# Patient Record
Sex: FEMALE | Race: BLACK OR AFRICAN AMERICAN | Employment: PART TIME | URBAN - METROPOLITAN AREA
[De-identification: names, ages, dates, MRNs, and addresses within clinical notes are randomized per-mention and may not be internally consistent; named-entity substitution may affect disease eponyms.]

---

## 2019-09-11 ENCOUNTER — APPOINTMENT (OUTPATIENT)
Dept: GENERAL RADIOLOGY | Age: 29
End: 2019-09-11
Payer: COMMERCIAL

## 2019-09-11 ENCOUNTER — HOSPITAL ENCOUNTER (EMERGENCY)
Age: 29
Discharge: HOME | End: 2019-09-11
Payer: COMMERCIAL

## 2019-09-11 VITALS
HEIGHT: 67 IN | OXYGEN SATURATION: 99 % | DIASTOLIC BLOOD PRESSURE: 90 MMHG | SYSTOLIC BLOOD PRESSURE: 124 MMHG | HEART RATE: 80 BPM | TEMPERATURE: 98 F | BODY MASS INDEX: 19.34 KG/M2 | RESPIRATION RATE: 16 BRPM | WEIGHT: 123.24 LBS

## 2019-09-11 DIAGNOSIS — S80.01XA CONTUSION OF RIGHT KNEE, INITIAL ENCOUNTER: ICD-10-CM

## 2019-09-11 DIAGNOSIS — S09.90XA CLOSED HEAD INJURY, INITIAL ENCOUNTER: ICD-10-CM
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
## 2019-09-11 DIAGNOSIS — S80.02XA CONTUSION OF LEFT KNEE, INITIAL ENCOUNTER: Primary | ICD-10-CM

## 2019-09-11 PROCEDURE — 73560 X-RAY EXAM OF KNEE 1 OR 2: CPT

## 2019-09-11 PROCEDURE — 6370000000 HC RX 637 (ALT 250 FOR IP): Performed by: PHYSICIAN ASSISTANT

## 2019-09-11 PROCEDURE — 99284 EMERGENCY DEPT VISIT MOD MDM: CPT

## 2019-09-11 RX ORDER — NAPROXEN 500 MG/1
500 TABLET ORAL 2 TIMES DAILY PRN
Qty: 20 TABLET | Refills: 0 | Status: SHIPPED | OUTPATIENT
Start: 2019-09-11 | End: 2022-08-31

## 2019-09-11 RX ORDER — NAPROXEN 250 MG/1
500 TABLET ORAL ONCE
Status: COMPLETED | OUTPATIENT
Start: 2019-09-11 | End: 2019-09-11

## 2019-09-11 RX ORDER — ACETAMINOPHEN 500 MG
1000 TABLET ORAL ONCE
Status: COMPLETED | OUTPATIENT
Start: 2019-09-11 | End: 2019-09-11

## 2019-09-11 RX ADMIN — NAPROXEN 500 MG: 250 TABLET ORAL at 09:31

## 2019-09-11 RX ADMIN — ACETAMINOPHEN 1000 MG: 500 TABLET ORAL at 09:31

## 2019-09-11 ASSESSMENT — PAIN - FUNCTIONAL ASSESSMENT
PAIN_FUNCTIONAL_ASSESSMENT: ACTIVITIES ARE NOT PREVENTED
PAIN_FUNCTIONAL_ASSESSMENT: ACTIVITIES ARE NOT PREVENTED
PAIN_FUNCTIONAL_ASSESSMENT: 0-10
PAIN_FUNCTIONAL_ASSESSMENT: ACTIVITIES ARE NOT PREVENTED

## 2019-09-11 ASSESSMENT — PAIN SCALES - GENERAL
PAINLEVEL_OUTOF10: 8
PAINLEVEL_OUTOF10: 8
PAINLEVEL_OUTOF10: 6

## 2019-09-11 ASSESSMENT — PAIN DESCRIPTION - PROGRESSION
CLINICAL_PROGRESSION: GRADUALLY IMPROVING
CLINICAL_PROGRESSION: GRADUALLY IMPROVING
CLINICAL_PROGRESSION: NOT CHANGED

## 2019-09-11 ASSESSMENT — PAIN DESCRIPTION - PAIN TYPE
TYPE: ACUTE PAIN

## 2019-09-11 ASSESSMENT — PAIN DESCRIPTION - DESCRIPTORS
DESCRIPTORS: ACHING;THROBBING;BURNING
DESCRIPTORS: ACHING;THROBBING;BURNING

## 2019-09-11 ASSESSMENT — PAIN DESCRIPTION - ORIENTATION
ORIENTATION: RIGHT;LEFT

## 2019-09-11 ASSESSMENT — PAIN DESCRIPTION - ONSET
ONSET: ON-GOING

## 2019-09-11 ASSESSMENT — ENCOUNTER SYMPTOMS
VOMITING: 0
ABDOMINAL PAIN: 0
SHORTNESS OF BREATH: 0
NAUSEA: 0
BACK PAIN: 0

## 2019-09-11 ASSESSMENT — PAIN DESCRIPTION - FREQUENCY
FREQUENCY: CONTINUOUS

## 2019-09-11 ASSESSMENT — PAIN DESCRIPTION - LOCATION
LOCATION: KNEE

## 2019-09-11 NOTE — LETTER
[facility] Emergency Department  [address] [facility] [address]  Phone: [phone]               September 11, 2019    Patient: Sherine Castañeda   YOB: 1990   Date of Visit: 9/11/2019       To Whom It May Concern:    Sherine Castañeda was seen and treated in our emergency department on 9/11/2019. She may return to work on 9/12/19.       Sincerely,       Leticia Storey RN         Signature:__________________________________

## 2019-09-11 NOTE — ED PROVIDER NOTES
reviewed. No pertinent surgical history. CURRENT MEDICATIONS       Discharge Medication List as of 9/11/2019 10:27 AM          ALLERGIES     Patient has no known allergies. FAMILY HISTORY     History reviewed. No pertinent family history. No family status information on file. SOCIAL HISTORY    reports that she has quit smoking. Her smoking use included cigars. She has never used smokeless tobacco. She reports that she has current or past drug history. Drug: Marijuana. She reports that she does not drink alcohol. PHYSICAL EXAM    (up to 7 for level 4, 8 or more for level 5)     ED Triage Vitals   BP Temp Temp Source Pulse Resp SpO2 Height Weight   09/11/19 0918 09/11/19 1015 09/11/19 1015 09/11/19 0918 09/11/19 0918 09/11/19 0918 09/11/19 0918 09/11/19 0918   (!) 126/98 98 °F (36.7 °C) Oral 89 17 99 % 5' 7\" (1.702 m) 123 lb 3.8 oz (55.9 kg)       Physical Exam   Constitutional: She is oriented to person, place, and time. She appears well-developed and well-nourished. No distress. HENT:   Head: Normocephalic and atraumatic. No hematoma or ecchymosis noted   Eyes: Pupils are equal, round, and reactive to light. EOM are normal.   Neck: Neck supple. No tenderness   Pulmonary/Chest: Effort normal. No respiratory distress. Musculoskeletal: Normal range of motion. 2 small ecchymotic areas to the medial aspect of the calves bilaterally. There is mild diffuse tenderness to the knees bilaterally without edema or effusion, full ROM bilateral knees. Neurological: She is alert and oriented to person, place, and time. Skin: Skin is warm and dry. Psychiatric: She has a normal mood and affect. Her behavior is normal.   Nursing note and vitals reviewed.          DIAGNOSTIC RESULTS     RADIOLOGY:   Non-plain film images such as CT, Ultrasound and MRI are read by the radiologist.Plain radiographic images are visualized and preliminarily interpreted by Junito Phoenix PA-C with the below

## 2022-08-31 ENCOUNTER — HOSPITAL ENCOUNTER (EMERGENCY)
Age: 32
Discharge: HOME | End: 2022-08-31
Attending: EMERGENCY MEDICINE
Payer: COMMERCIAL

## 2022-08-31 ENCOUNTER — APPOINTMENT (OUTPATIENT)
Dept: GENERAL RADIOLOGY | Age: 32
End: 2022-08-31
Payer: COMMERCIAL

## 2022-08-31 VITALS
TEMPERATURE: 97.6 F | HEART RATE: 88 BPM | HEIGHT: 67 IN | OXYGEN SATURATION: 99 % | BODY MASS INDEX: 18.2 KG/M2 | SYSTOLIC BLOOD PRESSURE: 126 MMHG | RESPIRATION RATE: 18 BRPM | WEIGHT: 115.96 LBS | DIASTOLIC BLOOD PRESSURE: 72 MMHG

## 2022-08-31 DIAGNOSIS — M25.511 ACUTE PAIN OF RIGHT SHOULDER: ICD-10-CM

## 2022-08-31 DIAGNOSIS — S39.012A BACK STRAIN, INITIAL ENCOUNTER: Primary | ICD-10-CM

## 2022-08-31 DIAGNOSIS — S46.811A TRAPEZIUS STRAIN, RIGHT, INITIAL ENCOUNTER: ICD-10-CM

## 2022-08-31 PROCEDURE — 99283 EMERGENCY DEPT VISIT LOW MDM: CPT

## 2022-08-31 PROCEDURE — 73030 X-RAY EXAM OF SHOULDER: CPT

## 2022-08-31 PROCEDURE — 71046 X-RAY EXAM CHEST 2 VIEWS: CPT

## 2022-08-31 PROCEDURE — 6370000000 HC RX 637 (ALT 250 FOR IP): Performed by: EMERGENCY MEDICINE

## 2022-08-31 RX ORDER — METHOCARBAMOL 500 MG/1
500 TABLET, FILM COATED ORAL 4 TIMES DAILY
Qty: 40 TABLET | Refills: 0 | Status: SHIPPED | OUTPATIENT
Start: 2022-08-31 | End: 2022-09-10

## 2022-08-31 RX ORDER — NAPROXEN 500 MG/1
500 TABLET ORAL 2 TIMES DAILY
Qty: 15 TABLET | Refills: 0 | Status: SHIPPED | OUTPATIENT
Start: 2022-08-31

## 2022-08-31 RX ORDER — NAPROXEN 250 MG/1
500 TABLET ORAL ONCE
Status: COMPLETED | OUTPATIENT
Start: 2022-08-31 | End: 2022-08-31

## 2022-08-31 RX ADMIN — NAPROXEN 500 MG: 250 TABLET ORAL at 11:44

## 2022-08-31 ASSESSMENT — PAIN SCALES - GENERAL
PAINLEVEL_OUTOF10: 10
PAINLEVEL_OUTOF10: 7

## 2022-08-31 ASSESSMENT — PAIN DESCRIPTION - LOCATION: LOCATION: BACK

## 2022-08-31 ASSESSMENT — LIFESTYLE VARIABLES
HOW MANY STANDARD DRINKS CONTAINING ALCOHOL DO YOU HAVE ON A TYPICAL DAY: PATIENT DOES NOT DRINK
HOW OFTEN DO YOU HAVE A DRINK CONTAINING ALCOHOL: NEVER

## 2022-08-31 ASSESSMENT — PAIN - FUNCTIONAL ASSESSMENT
PAIN_FUNCTIONAL_ASSESSMENT: NONE - DENIES PAIN
PAIN_FUNCTIONAL_ASSESSMENT: 0-10

## 2022-08-31 NOTE — Clinical Note
Micah Kimrei was seen and treated in our emergency department on 8/31/2022. She may return to work on 09/02/2022. If you have any questions or concerns, please don't hesitate to call.       Giacomo Milner, DO

## 2022-08-31 NOTE — ED PROVIDER NOTES
[address] [facility]      Pt Name: Eliana Casillas  MRN: [id number]  [place] 1990  Date of evaluation: 8/31/2022  Provider: Дмитрий Templeton, [address] [place] [address]       Chief Complaint   Patient presents with    Back Pain    Arm Pain         HISTORY OF PRESENT ILLNESS   (Location/Symptom, Timing/Onset, Context/Setting, Quality, Duration, Modifying Factors, Severity)  Note limiting factors. Eliana Casillas is a 27 y.o. female who presents to the emergency department with a complaint of an injury to her back and arm. The patient states that symptoms initially began on Wednesday, exactly 1 week ago. In the day leading up to the onset of her symptoms, she reports that she did a lot of tedious braiding of hair, 3 separate people, taking several hours each time. She states that she was in a strange position for a prolonged period of time. She awakened the next day with increased pain. She reports pain along the medial aspect of the right scapula radiating into the right shoulder. She states that anytime she tries to lift her right arm she has severe pain in that area. She has not yet been using any heating pads. She denies any weakness or numbness. She denies any associated chest pain shortness of breath heaviness pressure or tightness. No fever or chills. No cough or cold symptoms. She denies any definite moment of injury, or direct trauma to the area. She denies any abdominal pain nausea vomiting or diarrhea. No radicular pain. No incontinence. No bowel or bladder difficulties. No saddle anesthesia. She does not take any anticoagulants. She denies any drug use. She is not diabetic. He denies any personal or family history of thromboembolic disease. No leg or calf pain. No shortness of breath or dyspnea on exertion. Nursing Notes were reviewed.     HPI        REVIEW OF SYSTEMS    (2-9 systems for level 4, 10 or more for level 5)       Constitutional: Negative for fever or chills. HENT: Negative for rhinorrhea and sore throat. Eyes: Negative for redness or drainage. Respiratory: Negative for shortness of breath or dyspnea on exertion. Cardiovascular: Negative for chest pain. Gastrointestinal: Negative for abdominal pain. Negative for vomiting or diarrhea. Genitourinary: Negative for flank pain. Negative for dysuria. Negative for hematuria. Neurological: Negative for headache. Musculoskeletal:  Negative edema. Hematological: Negative for adenopathy. All systems are reviewed and are negative except for those listed above in the history of present illness and ROS. PAST MEDICAL HISTORY     Past Medical History:   Diagnosis Date    Asthma          SURGICAL HISTORY     History reviewed. No pertinent surgical history. CURRENT MEDICATIONS       Previous Medications    No medications on file       ALLERGIES     Patient has no known allergies. FAMILY HISTORY     History reviewed. No pertinent family history. SOCIAL HISTORY       Social History     Socioeconomic History    Marital status: Single     Spouse name: None    Number of children: None    Years of education: None    Highest education level: None   Tobacco Use    Smoking status: Former     Types: Cigars    Smokeless tobacco: Never   Substance and Sexual Activity    Alcohol use: No    Drug use: Yes     Types: Marijuana (Weed)     Comment: daily       SCREENINGS    [place] Coma Scale  Best Verbal Response: Oriented  Best Motor Response: Obeys commands        PHYSICAL EXAM    (up to 7 for level 4, 8 or more for level 5)     ED Triage Vitals [08/31/22 1128]   BP Temp Temp Source Heart Rate Resp SpO2 Height Weight   110/70 97.6 °F (36.4 °C) Oral 90 16 98 % 5' 7\" (1.702 m) 115 lb 15.4 oz (52.6 kg)         Physical Exam   Constitutional: Awake and alert. Very pleasant. Appears comfortable.   No pain at rest.  Head: No visible evidence of trauma. Normocephalic. Eyes: Pupils equal and reactive. No photophobia. Conjunctiva normal.    HENT: Oral mucosa moist.  Airway patent. Pharynx without erythema. Nares were clear. Neck:  Soft and supple. Nontender. Heart:  Regular rate and rhythm. No murmur. Lungs:  Clear to auscultation. No wheezes, rales, or ronchi. No conversational dyspnea or accessory muscle use. Chest: Chest wall non-tender. No evidence of trauma. Abdomen:  Soft, nondistended, bowel sounds present. Nontender. No guarding rigidity or rebound. No masses. Musculoskeletal: Thoracic and lumbar spine are nontender. No point tenderness or step-off. There was exquisite tenderness along the medial aspect of the right scapula along the rhomboid muscle groups and trapezius muscle group extending into the right shoulder along the distribution of the trapezius. Pain was significant extremities non-tender with full range of motion. Radial and dorsalis pedis pulses were intact. No calf tenderness erythema or edema. Moderate spasm noted in the right trapezius muscle. The patient resisted range of motion secondary to pain. Moderate discomfort with range of motion of the right shoulder. No bony tenderness. No joint effusion. No warmth or erythema. Neurological: Alert and oriented x 3. Speech clear. Cranial nerves II-XII intact. No facial droop. No acute focal motor or sensory deficits. Radial median and ulnar nerve are fully intact. No dysarthria. No aphasia. No pronator drift. Skin: Skin is warm and dry. No rash. Lymphatic:  No lympadenopathy. Psychiatric: Normal mood and affect.  Behavior is normal.         DIAGNOSTIC RESULTS     EKG: All EKG's are interpreted by the Emergency Department Physician who either signs or Co-signs this chart in the absence of a cardiologist.        RADIOLOGY:   Non-plain film images such as CT, Ultrasound and MRI are read by the radiologist. Plain radiographic images are visualized and preliminarily interpreted by the emergency physician with the below findings:        Interpretation per the Radiologist below, if available at the time of this note:    XR SHOULDER RIGHT (MIN 2 VIEWS)   Final Result   Negative         XR CHEST (2 VW)   Final Result   No acute abnormality               ED BEDSIDE ULTRASOUND:   Performed by ED Physician - none    LABS:  Labs Reviewed - No data to display    All other labs were within normal range or not returned as of this dictation. EMERGENCY DEPARTMENT COURSE and DIFFERENTIAL DIAGNOSIS/MDM:   Vitals:    Vitals:    08/31/22 1128   BP: 110/70   Pulse: 90   Resp: 16   Temp: 97.6 °F (36.4 °C)   TempSrc: Oral   SpO2: 98%   Weight: 115 lb 15.4 oz (52.6 kg)   Height: 5' 7\" (1.702 m)         MDM        The patient presents to the emergency department with complaint of right shoulder and back pain as noted above. I suspect that she likely has a back strain involving the rhomboids and trapezius muscle group. There is also some significant muscle spasm. Pain is reproducible with pain and range of motion. She is comfortable at rest when not moving. No associated chest pain or shortness of breath. She is afebrile. She is hemodynamically stable. X-rays of the right shoulder and chest were obtained. REASSESSMENT        1:10 PM: X-rays were reviewed and were unremarkable. She will be given a prescription for Robaxin and naproxen. Follow-up with a primary care physician in 1 to 2 days for reexamination. If condition worsens or new symptoms develop, return immediately to the emergency department. Use ice to the affected areas. Avoid heat or heating pads. No lifting greater than 5 pounds. Avoid strenuous lifting or heavy physical activity for 1 week. Do not drive or operate machinery while taking pain medication or muscle relaxants. May cause drowsiness.      CRITICAL CARE TIME   Total Critical Care time was 0 minutes, excluding separately reportable procedures. There was a high probability of clinically significant/life threatening deterioration in the patient's condition which required my urgent intervention. CONSULTS:  None    PROCEDURES:  Unless otherwise noted below, none     Procedures        FINAL IMPRESSION      1. Back strain, initial encounter    2. Acute pain of right shoulder    3. Trapezius strain, right, initial encounter          DISPOSITION/PLAN   DISPOSITION Discharge - Pending Orders Complete 08/31/2022 01:30:53 PM      PATIENT REFERRED TO:  [facility]) Referral  Call [phone] for an appointment  Call today      DISCHARGE MEDICATIONS:  New Prescriptions    METHOCARBAMOL (ROBAXIN) 500 MG TABLET    Take 1 tablet by mouth 4 times daily for 10 days    NAPROXEN (NAPROSYN) 500 MG TABLET    Take 1 tablet by mouth 2 times daily     Controlled Substances Monitoring:     No flowsheet data found. (Please note that portions of this note were completed with a voice recognition program.  Efforts were made to edit the dictations but occasionally words are mis-transcribed. )    1518 Hank Valderrama DO (electronically signed)  Attending Emergency Physician           Irlanda Galloway DO  08/31/22 3517

## 2022-08-31 NOTE — ED TRIAGE NOTES
Patient presents to the ER with c/o right scapula pain and pain down her right arm. Patient reports it started in her right index finger and has radiated upward. Rates pain 10/10 - reports she has not tried anything at home for the pain. Denies injury/trauma. AOX4.  Respirations even and unlabored

## 2022-08-31 NOTE — DISCHARGE INSTRUCTIONS
Follow-up with a primary care physician in 1 to 2 days for reexamination. If condition worsens or new symptoms develop, return immediately to the emergency department. Use ice to the affected areas. Avoid heat or heating pads. No lifting greater than 5 pounds. Avoid strenuous lifting or heavy physical activity for 1 week. Do not drive or operate machinery while taking pain medication or muscle relaxants. May cause drowsiness.